# Patient Record
Sex: MALE | Race: WHITE | NOT HISPANIC OR LATINO | Employment: OTHER
[De-identification: names, ages, dates, MRNs, and addresses within clinical notes are randomized per-mention and may not be internally consistent; named-entity substitution may affect disease eponyms.]

---

## 2019-09-29 ENCOUNTER — HEALTH MAINTENANCE LETTER (OUTPATIENT)
Age: 69
End: 2019-09-29

## 2020-03-15 ENCOUNTER — HEALTH MAINTENANCE LETTER (OUTPATIENT)
Age: 70
End: 2020-03-15

## 2021-01-14 ENCOUNTER — HEALTH MAINTENANCE LETTER (OUTPATIENT)
Age: 71
End: 2021-01-14

## 2021-05-08 ENCOUNTER — HEALTH MAINTENANCE LETTER (OUTPATIENT)
Age: 71
End: 2021-05-08

## 2021-08-05 ENCOUNTER — TRANSFERRED RECORDS (OUTPATIENT)
Dept: HEALTH INFORMATION MANAGEMENT | Facility: CLINIC | Age: 71
End: 2021-08-05
Payer: MEDICARE

## 2021-10-23 ENCOUNTER — HEALTH MAINTENANCE LETTER (OUTPATIENT)
Age: 71
End: 2021-10-23

## 2022-04-28 ENCOUNTER — TRANSCRIBE ORDERS (OUTPATIENT)
Dept: OTHER | Age: 72
End: 2022-04-28
Payer: MEDICARE

## 2022-04-28 ENCOUNTER — DOCUMENTATION ONLY (OUTPATIENT)
Dept: ONCOLOGY | Facility: CLINIC | Age: 72
End: 2022-04-28
Payer: MEDICARE

## 2022-04-28 ENCOUNTER — PATIENT OUTREACH (OUTPATIENT)
Dept: ONCOLOGY | Facility: CLINIC | Age: 72
End: 2022-04-28
Payer: MEDICARE

## 2022-04-28 DIAGNOSIS — C61 PROSTATE CANCER (H): Primary | ICD-10-CM

## 2022-04-28 NOTE — PROGRESS NOTES
New Patient Oncology Nurse Navigator Note     Referring provider: Dr. Pradip Goodson     Referring Clinic/Organization: [facility]     Referred to (specialty): Medical Oncology    Requested provider (if applicable): Dr. Walker     Date Referral Received: 4/28/22     Evaluation for : Prostate cancer- second opinion- possible clinical trials and Pluvicto/PSMA therapy discussion     Clinical History (per Nurse review of records provided):    ASSESSMENT:   Copied from [facility] med onc progress note dated 4/27/22    1. Progressing metastatic high-grade prostate carcinoma with mediastinal, retrocrural and retroperitoneal adenopathy. The burden of disease is on the small side at present. PSA mostly stable.  2. History of radical retropubic prostatectomy and pelvic lymph node dissection for Maribel 9 prostate carcinoma with bilateral seminal vesicle involvement. This took place in March 2004. He received postoperative radiation therapy completed in August 2004.  3. Lymph node posterior to the esophagus identified in the summer of 2021. Biopsy on August 5, 2021, confirmed this to represent prostate carcinoma. This was the only area of metastatic disease identified at that time. He completed radiation therapy to that area on 11/26/2021.  4. Ongoing hormone therapy with Lupron and Xtandi.  5. Previous hormone therapy with Casodex which was discontinued in November 2013 when progression of the prostate cancer management was noted.  6. Previous treatment with Zytiga which was initiated in January 2014, and discontinued in January 2020.  7. Status post initiation of Xtandi in January 2020. There was initial difficulty with tolerance and the dose was reduced. Later, we were able to increase the dose to target.  8. History of sleep apnea.  9. History of sleep disorder.   10. History of restless legs syndrome.  11. History of low back pain and surgery for low back pain.  12. Status post placement of spinal  stimulator for the back pain management.  13. History of headache, improved.  14. History of altered taste and decreased appetite.            Records Location (Care Everywhere, Media, etc.):   [facility]     Records Needed:   [facility] per protocol    I attempted to call Ray to discuss referral to medical oncology.  I left a message for him to call me back.  I will also forward referral on to NPS to schedule an appointment.     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  I discussed with him the above appointment and he would like to move forward with seeing Dr. Walker on 5/18.  He is very interested in discussing the Pluvicto therapy.  He has no other questions at this time.  I let him know our NPS will call him to finalize this appt and potentially our MR team may reach out to obtain a NINO if needed to obtain imaging and pathology.  He has the location and main clinic number, as well as mine if needed for future questions.

## 2022-05-11 NOTE — TELEPHONE ENCOUNTER
RECORDS STATUS - ALL OTHER DIAGNOSIS      RECORDS RECEIVED FROM: [place] (imaging previously resolved)   DATE RECEIVED:    NOTES STATUS DETAILS   OFFICE NOTE from referring provider  -  Dr. Pradip Goodson   OFFICE NOTE from medical oncologist  -  Dr. Goodson: 22   DISCHARGE SUMMARY from hospital     DISCHARGE REPORT from the ER     OPERATIVE REPORT  -  21: EGD, EUS  3/18/04: radical retropubic prostatectomy      MEDICATION LIST [facility]   LABS     PATHOLOGY REPORTS [facility], Report in CE, slides requested   [employer] 21: [id number]  3/18/04: [id number]  04: [id number]   ANYTHING RELATED TO DIAGNOSIS Epic/CE 22   GENONOMIC TESTING     TYPE: Received . -  22: Oncotype   IMAGING (NEED IMAGES & REPORT)     CT SCANS     MRI     MAMMO     ULTRASOUND     PET

## 2022-05-17 NOTE — PROGRESS NOTES
NEW PATIENT SECOND OPINION CONSULTATION    Reason for Visit:  HOXB13 (G84E)-associated metastatic CRPC s/p abiraterone, now on enzalutamide.    History of Present Illness:  Dear Dr Pradip Goodson,    Thank you for referring your patient for a Second Opinion consultation at the [facility].  A brief overview of his oncological history as well as my recommendations follow below.     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 He underwent radical prostatectomy on 03/18/2004. Surgical pathology showed Catherine score 4+5=9 tumor involving approximately 40% of the prostate gland (right greater than left) with bilateral seminal vesicle invasion, positive margin at the right mid prostate and very close (less than 1 mm) margins in the prostatic apex and prostatic base, periprostatic adipose tissue extension at the prostate base, focal perineural invasion, small vessel lymphatic invasion, and 0/8 pelvic lymph nodes involved by metastatic disease [pT3b N0 R1]. PSA dropped post-operatively to 4.9 ng/mL on 04/09/04, but increased to 6.4 ng/mL on 04/21/04. He then started Lupron with an initial period of Casodex on 04/23/04, with PSA dropping to 0.3 ng/mL on 05/26/04. This was followed by salvage RT, from 06/28/04 to 08/19/04, to a total dose of 6840 cGy in 38 fractions.     PSA following salvage RT subsequently became undetectable at <0.1 ng/mL, but slowly began to rise until it increased to 1.4 ng/mL in 03/2010. Casodex was added to Lupron on 03/10/10 for dual androgen blockade. His PSA initially improved although never became undetectable, but later began to increase again. When PSA reached 1.8 ng/mL in 11/2013, Casodex was stopped, and when it increased further to 2.3 ng/mL in 1/2014, abiraterone 1000 mg was added on 01/31/14. His PSA remained under good control  on abiraterone at 0.2 ng/mL until 01/2020 when it slowly increased to 0.7 ng/mL. Abiraterone was then discontinued, and enzalutamide was started at 160 mg daily on 01/22/2020 which was eventually dose-reduced to 80 mg daily due to side effects and issues with tolerability, but was increased back to 160 mg daily dose in a graduated manner after another biochemical relapse.     CT imaging dated 07/16/2021 showed a slight increase in size of one mediastinal lymph node posterior to the esophagus compared to the CT from 04/2021. No prior history of distant metastatic disease. All prior CT scans had been unremarkable. Bone scan done 12/2006 had shown a new focus of radiotracer uptake in the left femoral neck which was favored to represent resolving stress reaction-related bone marrow edema on serial MRI exams and subsequent bone scans were unremarkable. Patient underwent lymph node biopsy on 08/05/2021, which demonstrated one malignant-appearing periesophageal lymph node in the 4R right lower paratracheal region measuring 20 x 15 mm, and FNA was positive for prostatic adenocarcinoma. He underwent an Axumin-PET/CT scan on 08/25/21, which was unremarkable. He was next treated with RT to a small field covering the gross tumor, using 5750 cGy in 23 fractions, from 10/25/21 through 11/24/2021.     More recently, he underwent a PSMA-PET/CT scan on 04/19/2022 which demonstrated radiotracer-avid posterior descending thoracic aortic, upper paraesophageal, retroperitoneal and retrocrural lymphadenopathy suspicious for metastases. He last met with Dr. Goodson on 04/27/2022, and was referred to us for a Second Opinion consultation regarding further management, including the potential appropriateness of 177Lu-PSMA-617. He has continued to be on enzalutamide which was dose decreased again to 80 mg daily on 07/9/2021 due to issues with weight loss and altered taste.    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   He does have some fatigue from the enzalutamide, which has improved with the lower dose.  He does not report any new signs or symptoms in recent weeks.  He has not gained or lost any weight recently.  He does not have any bone pain.  A comprehensive 14-system review of symptoms is otherwise generally unremarkable.  His ECOG score is 0.  His pain score is 3/10.    Past Medical History:  Hypertension  Atrial fibrillation  Restless legs syndrome  Multilevel disc degeneration of the spine   Chronic back pain s/p spinal stimulator  Obstructive sleep apnea  Hypersomnia  Lichen Planus    Past Surgical History:  Tonsillectomy  Lumbar laminectomy   Hemorrhoidectomy    Medications:  Lupron 22.5 mg q3mo (last, 22)  Enzalutamide 80 mg  Metoprolol succinate   Rivaroxaban  Pregabalin  Modafinil  Armodafinil  Hydroxyzine  Methadone 5 mg Q8H  Percocet 10/325 mg PRN   Ginseng extract    Social History:  Patient lives in [facility]) with his wife, Evangelina. They have two adult children. He is a retired clinical psychologist. He is a never-smoker. No history of alcohol use disorder or abuse.     Family History:  Patient's father had prostate cancer at 71, and  at 85. His family history is additionally pertinent for gastric cancer, colon cancer, breast cancer, lung cancer, lymphoma as well as colon polyps.    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 His vital signs are unremarkable.  His HEENT examination is within normal limits.  There is no palpable cervical, axillary, or inguinal lymphadenopathy.  The cardiac, pulmonary, and gastrointestinal examinations are generally within normal limits.  The extremities do not demonstrate pitting edema.  The neuromuscular examination is grossly intact.  The skin evaluation is unremarkable.     Recent PSA values:  Last PSA 2.0 ng/mL on 2022 compared to 1.6 ng/mL on 22, 3.1 ng/mL on 10/11/21, 3.1 ng/mL on 21, 2.8 ng/mL on 21, and 1.8 ng/mL on  01/21/21. Last serum testosterone level was 17 ng/dL in 2007.     Oncotype MAP Pan-Cancer Tissue test:  This was sent on 04/28/2022 from the periesophageal tita biopsy. It showed AR overexpression (3+ IHC), but was otherwise unremarkable (BRCA1/2 wildtype, TMB low 5 muts/mb, MS stable, and PD-L1 protein IHC 0%).  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    [employer] Prostate Cancer Panel:   This was sent in 05/2021. It showed an increased risk allele in the HOXB13 gene, specifically c.251G>A (p.G84E). This is a germline  variant in individuals of [place] descent.  His paternal relatives have a history of the same variant. No pathogenic variants were identified in the remaining 83 of 84 genes in the panel.     PSMA-PET scan 4/19/22:  FINDINGS: Radiotracer-positive bilateral retroperitoneal, retrocrural, posterior descending thoracic aortic, and upper paraesophageal lymph nodes suspicious for metastases. The remaining radiotracer distribution is physiologic from the skull vertex to mid thigh. Mild senescent intracranial changes. Right renal cyst. Spinal stimulation device in the left posterior flank soft tissues. Status post prostatectomy. Multilevel degenerative changes in the cervical spine. IMPRESSION: Findings suspicious for prostate cancer metastases involving lymph nodes above and below the diaphragm, but no osseous involvement.      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  He now has mCRPC with biopsy-proven disease in one paraesophageal node (08/2021) as well as PSMA-PET evidence of additional lymph tita involvement both above and below the  diaphragm (04/2022).  His ECOG score is 0.  His pain score is 0/10.    We spoke about several treatment options moving forward.  Docetaxel would be category-1 recommendation for treatment of mCRPC based on [id number] and [id number] trials. There seem to be no contraindication to docetaxel based on history and review of routine labs from 04/2022. Given patient is relatively asymptomatic with an overall low radiographic burden of metastatic disease confined to lymph nodes, we can choose to continue expectant monitoring (with continued enzalutamide treatment) and defer starting docetaxel until a later time. Since the survival benefit from docetaxel is applicable to patients both with and without symptoms, we can consider it with signs of symptomatic progression or visceral metastases even if the patient remains asymptomatic.      We propose that subsequent disease progression beyond docetaxel should be treated with cabazitaxel if patient remains a chemotherapy candidate. 177Lu-PSMA-617 was recently approved by the FDA for treatment of patients with recurrent mCRPC who were previously treated with a novel hormonal agents and a taxane-based chemotherapy, and would offer a viable additional therapeutic platform in that scenario. We should obtain an updated PSMA PET/CT at that time prior to radioligand therapy. At this time, he is not yet eligible for 177Lu-PSMA-617 treatment because he has not received prior taxane-based chemotherapy.     Thank you for involving us in the care of Dr. Prakash. Please do not hesitate to contact us should any further questions or concerns arise in the future.  A total of 80 minutes were spent on this patient today, of which more than 50% of this time was used for counseling and coordination of care.    Cameron Walker M.D.

## 2022-05-18 ENCOUNTER — LAB (OUTPATIENT)
Dept: LAB | Facility: CLINIC | Age: 72
End: 2022-05-18
Attending: INTERNAL MEDICINE
Payer: MEDICARE

## 2022-05-18 ENCOUNTER — PRE VISIT (OUTPATIENT)
Dept: ONCOLOGY | Facility: CLINIC | Age: 72
End: 2022-05-18
Payer: MEDICARE

## 2022-05-18 ENCOUNTER — ONCOLOGY VISIT (OUTPATIENT)
Dept: ONCOLOGY | Facility: CLINIC | Age: 72
End: 2022-05-18
Attending: INTERNAL MEDICINE
Payer: MEDICARE

## 2022-05-18 VITALS
HEART RATE: 64 BPM | HEIGHT: 71 IN | RESPIRATION RATE: 18 BRPM | SYSTOLIC BLOOD PRESSURE: 144 MMHG | WEIGHT: 176.4 LBS | DIASTOLIC BLOOD PRESSURE: 87 MMHG | TEMPERATURE: 98.8 F | BODY MASS INDEX: 24.69 KG/M2 | OXYGEN SATURATION: 96 %

## 2022-05-18 DIAGNOSIS — C61 PROSTATE CANCER (H): Primary | ICD-10-CM

## 2022-05-18 DIAGNOSIS — C61 PROSTATE CANCER (H): ICD-10-CM

## 2022-05-18 PROCEDURE — G0463 HOSPITAL OUTPT CLINIC VISIT: HCPCS

## 2022-05-18 PROCEDURE — 99205 OFFICE O/P NEW HI 60 MIN: CPT | Performed by: INTERNAL MEDICINE

## 2022-05-18 RX ORDER — MODAFINIL 200 MG/1
200 TABLET ORAL DAILY
COMMUNITY
Start: 2022-01-16

## 2022-05-18 RX ORDER — METOPROLOL SUCCINATE 50 MG/1
50 TABLET, EXTENDED RELEASE ORAL DAILY
COMMUNITY
Start: 2022-04-14

## 2022-05-18 RX ORDER — METHADONE HYDROCHLORIDE 5 MG/1
TABLET ORAL
COMMUNITY
Start: 2022-04-08

## 2022-05-18 RX ORDER — PREGABALIN 100 MG/1
CAPSULE ORAL
COMMUNITY
Start: 2022-04-04

## 2022-05-18 RX ORDER — HYDROXYZINE HYDROCHLORIDE 25 MG/1
TABLET, FILM COATED ORAL
COMMUNITY
Start: 2020-09-22

## 2022-05-18 RX ORDER — ENZALUTAMIDE 40 MG/1
CAPSULE ORAL
COMMUNITY
Start: 2022-04-25

## 2022-05-18 RX ORDER — RIVAROXABAN 20 MG/1
TABLET, FILM COATED ORAL
COMMUNITY
Start: 2022-04-14

## 2022-05-18 RX ORDER — ARMODAFINIL 150 MG/1
150 TABLET ORAL EVERY MORNING
COMMUNITY
Start: 2022-01-07

## 2022-05-18 RX ORDER — OXYCODONE AND ACETAMINOPHEN 10; 325 MG/1; MG/1
TABLET ORAL
COMMUNITY
Start: 2022-05-05

## 2022-05-18 ASSESSMENT — PAIN SCALES - GENERAL: PAINLEVEL: MODERATE PAIN (5)

## 2022-05-18 NOTE — NURSING NOTE
"Oncology Rooming Note    May 18, 2022 12:56 PM   Ray Prakash is a 71 year old male who presents for:    Chief Complaint   Patient presents with     New Patient     PROSTATE CANCER     Initial Vitals: BP (!) 144/87   Pulse 64   Temp 98.8  F (37.1  C) (Oral)   Resp 18   Ht 1.791 m (5' 10.51\")   Wt 80 kg (176 lb 6.4 oz)   SpO2 96%   BMI 24.94 kg/m   Estimated body mass index is 24.94 kg/m  as calculated from the following:    Height as of this encounter: 1.791 m (5' 10.51\").    Weight as of this encounter: 80 kg (176 lb 6.4 oz). Body surface area is 1.99 meters squared.  Moderate Pain (5) Comment: Data Unavailable   No LMP for male patient.  Allergies reviewed: Yes  Medications reviewed: Yes    Medications: Medication refills not needed today.  Pharmacy name entered into It[employer]beforeIbuy:    [id number] - [place], [place] - [address] [id number] - [place], [place] - [address]    Clinical concerns: New pt     Kalpana Vela CMA            "

## 2022-05-20 LAB
PATH REPORT.COMMENTS IMP SPEC: NORMAL
PATH REPORT.COMMENTS IMP SPEC: NORMAL
PATH REPORT.FINAL DX SPEC: NORMAL
PATH REPORT.RELEVANT HX SPEC: NORMAL
PATH REPORT.RELEVANT HX SPEC: NORMAL
PATH REPORT.SITE OF ORIGIN SPEC: NORMAL

## 2022-05-20 PROCEDURE — 88321 CONSLTJ&REPRT SLD PREP ELSWR: CPT | Performed by: PATHOLOGY

## 2022-05-24 LAB
PATH REPORT.COMMENTS IMP SPEC: NORMAL
PATH REPORT.FINAL DX SPEC: NORMAL
PATH REPORT.GROSS SPEC: NORMAL
PATH REPORT.MICROSCOPIC SPEC OTHER STN: NORMAL
PATH REPORT.RELEVANT HX SPEC: NORMAL
PATH REPORT.RELEVANT HX SPEC: NORMAL
PATH REPORT.SITE OF ORIGIN SPEC: NORMAL

## 2022-05-24 PROCEDURE — 88321 CONSLTJ&REPRT SLD PREP ELSWR: CPT | Mod: GC | Performed by: PATHOLOGY

## 2022-06-04 ENCOUNTER — HEALTH MAINTENANCE LETTER (OUTPATIENT)
Age: 72
End: 2022-06-04

## 2022-10-10 ENCOUNTER — HEALTH MAINTENANCE LETTER (OUTPATIENT)
Age: 72
End: 2022-10-10

## 2023-06-11 ENCOUNTER — HEALTH MAINTENANCE LETTER (OUTPATIENT)
Age: 73
End: 2023-06-11

## 2024-05-31 NOTE — PROGRESS NOTES
Action April 28, 2022 3:50 PM ABT   Action Taken Records updated in CE, image request sent to [facility]     Action May 2, 2022 3:51 PM ABT   Action Taken Images from  received and resolved to PACS        LM for pt to call back.    Pt presents ambulatory tot riage from home. Pt states notcied petechial rash this am, had labs drawn and PLT were 1. Pt has hx idiopathic thrombocytopaenia.

## 2024-08-04 ENCOUNTER — HEALTH MAINTENANCE LETTER (OUTPATIENT)
Age: 74
End: 2024-08-04

## 2024-09-03 NOTE — PROGRESS NOTES
SECOND OPINION CONSULTATION    Reason for Visit:  Metastatic node-predominant CRPC s/p abiraterone, enzalutamide, docetaxel, and darolutamide. Referral for consideration of 177Lu-PSMA-617.    History of Present Illness:  Dear Dr Pradip Goodson,    Thank you for referring your patient for a Second Opinion Consultation at the [facility].  A brief overview of his oncological history as well as my recommendations follow below.     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 He underwent radical prostatectomy on 03/18/2004. Surgical pathology showed [place] score 4+5=9 tumor involving approximately 40% of the prostate gland (right greater than left) with bilateral seminal vesicle invasion, positive margin at the right mid prostate and very close (less than 1 mm) margins in the prostatic apex and prostatic base, periprostatic adipose tissue extension at the prostate base, focal perineural invasion, small vessel lymphatic invasion, and 0/8 pelvic lymph nodes involved by metastatic disease [pT3b N0 R1]. PSA dropped post-operatively to 4.9 ng/mL on 04/09/04, but increased to 6.4 ng/mL on 04/21/04. He then started Lupron with an initial period of Casodex on 04/23/04, with PSA dropping to 0.3 ng/mL on 05/26/04. This was followed by salvage XRT, from 06/28/04 to 08/19/04, to a total dose of 6840 cGy in 38 fractions.     PSA following salvage XRT subsequently became undetectable at <0.1 ng/mL, but slowly began to rise until it increased to 1.4 ng/mL in 03/2010. Casodex was added to Lupron on 03/10/10 for dual androgen blockade. His PSA initially improved although never became undetectable, but later began to increase again. When PSA reached 1.8 ng/mL in 11/2013, Casodex was stopped, and when it increased further to 2.3 ng/mL in 1/2014, abiraterone 1000 mg was  added on 01/31/14. His PSA remained under good control on abiraterone at 0.2 ng/mL until 01/2020 when it slowly increased to 0.7 ng/mL. Abiraterone was then discontinued, and enzalutamide was started at 160 mg daily on 01/22/2020 which was eventually dose-reduced to 80 mg daily due to side effects and issues with tolerability, but was increased back to 160 mg daily dose in a graduated manner after another biochemical relapse.     CT imaging dated 07/16/2021 showed a slight increase in size of one mediastinal lymph node posterior to the esophagus compared to the CT from 04/2021. No prior history of distant metastatic disease. All prior CT scans had been unremarkable. Bone scan done 12/2006 had shown a new focus of radiotracer uptake in the left femoral neck which was favored to represent resolving stress reaction-related bone marrow edema on serial MRI exams and subsequent bone scans were unremarkable. Patient underwent lymph node biopsy on 08/05/2021, which demonstrated one malignant-appearing periesophageal lymph node in the 4R right lower paratracheal region measuring 20 x 15 mm, and FNA was positive for prostatic adenocarcinoma. He underwent an Axumin-PET/CT scan on 08/25/21, which was unremarkable. He was next treated with XRT to a small field covering the gross tumor, using 5750 cGy in 23 fractions, from 10/25/21 through 11/24/2021.     He underwent a PSMA-PET/CT scan on 04/19/2022 which demonstrated radiotracer-avid posterior descending thoracic aortic, upper paraesophageal, retroperitoneal and retrocrural lymphadenopathy suspicious for metastases. However, he had no osseous or visceral metastases at that time, and thus he continued with the lower dose of enzalutamide for one more year.  At that time (in mid 2022), he was considered for treatment with 177Lu-PSMA-617 but he was not deemed eligible because he had never received a taxane chemotherapy agent.  The patient then received docetaxel (for 6 cycles),  from 9/13/2023 until 1/3/2024.  The enzalutamide was eventually switched to darolutamide in 2/2024.  The patient then received combination therapy with ADT plus darolutamide until 8/9/2024, at which point the darolutamide was discontinued.    Over the past several months, the patient's PSA level has been steadily rising despite ongoing systemic therapy using ADT plus darolutamide.  For example, on 2/5/2024 the PSA was 3.8 ng/mL, on 5/17/2024 the PSA was 6.0 ng/mL, and on 8/9/2024 the PSA was 14.3 ng/mL.  He recently underwent another PSMA-PET scan on 8/6/2024. This showed overall increasing radiotracer uptake (compared to 2/5/2024) within previously seen lymphadenopathy in the neck, chest, and retroperitoneum concerning for interval disease worsening.  The patient is being referred at this time for consideration of 177Lu-PSMA-617 radioligand therapy.    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  He does have some fatigue and nausea from the darolutamide, which has improved with discontinuation.  He does not report any new signs or symptoms in recent weeks.  He has not gained or lost any weight recently.  He does not have any bone pain.  A comprehensive 14-system review of symptoms is otherwise generally unremarkable.  His ECOG score is 0.  His pain score is 2/10.    Past Medical/Surgical History:  Metastatic prostate cancer  Hypertension  Atrial fibrillation  Restless legs syndrome  Multilevel disc degeneration of the spine   Chronic back pain s/p spinal stimulator  Obstructive sleep apnea  Hypersomnia  Lichen Planus  Tonsillectomy  Lumbar laminectomy   Hemorrhoidectomy    Medications:  Lupron 22.5 mg every 3 months  Darolutamide - stopped  Metoprolol succinate   Rivaroxaban  Pregabalin  Modafinil  Armodafinil  Hydroxyzine  Methadone  Percocet  Ginseng extract    Social History:  Patient lives in [facility] with his wife, Evangelina. They have two adult children. He is a retired clinical  psychologist. He is a never-smoker. No history of alcohol use disorder or abuse.     Family History:  Patient's father had prostate cancer at 71, and  at 85. His family history is additionally pertinent for gastric cancer, colon cancer, breast cancer, lung cancer, and lymphoma.  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 His vital signs are unremarkable.  His HEENT examination is within normal limits.  There is no palpable cervical, axillary, or inguinal lymphadenopathy.  The cardiac, pulmonary, and gastrointestinal examinations are generally within normal limits.  The neuromuscular examination is grossly intact.  The extremities do not demonstrate pitting edema.  The skin evaluation is unremarkable.     Oncotype MAP Pan-Cancer Tissue test:  This was sent on 2022 from the periesophageal tita biopsy. It showed AR overexpression (3+ IHC), but was otherwise unremarkable (BRCA1/2 wild-type, TMB low 5 muts/mb, MS stable, and PD-L1 protein IHC 0%).  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    [facility] Prostate Cancer Panel:   This was sent in 2021. It showed an increased risk allele in the HOXB13 gene, specifically c.251G>A (p.G84E). This is a germline  variant in individuals of [place] descent.  His paternal relatives have a history of the same variant.    PSMA-PET scan 2022:  FINDINGS: Radiotracer-positive bilateral retroperitoneal, retrocrural, posterior descending thoracic aortic, and upper paraesophageal lymph nodes suspicious for metastases. The remaining radiotracer distribution is physiologic from the skull vertex to mid thigh. Mild senescent intracranial changes. Right renal cyst. Spinal stimulation device in the left posterior flank soft tissues. Status post prostatectomy. Multilevel degenerative changes in the  cervical spine. Findings suspicious for prostate cancer metastases involving lymph nodes above and below the diaphragm, but no osseous involvement.    PSMA-PET scan 2/5/2024:  Again seen are numerous involves subcentimeter lymph nodes as follows with corresponding SUV: right retrocrural 27.6 versus 42.4, additional right retrocrural 23.1 versus 28.2, left retrocrural 29.1 versus 35.2, additional left retrocrural 48.8 versus 50.4, aortocaval 36.9 versus 41.4, left periaortic SUV 16.6 versus 33.2. No radiotracer-avid bone or visceral metastases.    PSMA-PET scan 8/6/2024:  Overall increasing radiotracer uptake within previously seen lymphadenopathy in the neck, chest, and retroperitoneum concerning for interval disease worsening. New focal low level activity within the right pubic symphysis is nonspecific. No definitive scintigraphic evidence for osseous metastatic disease at this stage.     Recent PSA values:  On 2/5/2024 the PSA was 3.8 ng/mL, on 5/17/2024 the PSA was 6.0 ng/mL, and on 8/9/2024 the PSA was 14.3 ng/mL.       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  He then received 6 cycles of docetaxel chemotherapy (9/2023-1/2024), followed by darolutamide (2/2024 - 8/9/2024).  He has a positive PSMA-PET scan from 8/6/2024.  He is being referred for consideration of 177Lu-PSMA-617 radioligand therapy.  His ECOG score is 0.  His pain score is 0/10.    This patient has a long history of metastatic CRPC that involves primarily lymph node stations without osseous or visceral involvement.  He has received multiple prior systemic therapies including abiraterone, enzalutamide, docetaxel chemotherapy, and darolutamide.  In addition,  he had a recent PSMA-PET scan (8/6/2024) showing radiotracer-avid multifocal tita metastasis.  In this context, the most appropriate next line of systemic therapy would be 177Lu-PSMA-617.  He has certainly exhausted all reasonable lines of hormonal treatment, and he has already received a taxane chemotherapy in the CRPC setting.  Alternative treatment options would involve a second-line taxane agent such as cabazitaxel, although 177Lu-PSMA-617 would frankly be a better option with higher efficacy and lower toxicity.    We have spent the majority of our consultation today discussing 177Lu-PSMA-617 radioligand therapy in more detail.  In particular, we have reviewed some of the common adverse events related to this agent including fatigue, nausea, dry mouth, dry eyes, potential myelotoxicity, and potential nephrotoxicity.  We have discussed treatment logistics, which involves an intravenous injection every 6 weeks for a total of six doses.  I have indicated that the 177Lu-PSMA-617 treatment itself is administered by our Nuclear Medicine colleagues rather than in the Medical Oncology clinic.  We have reviewed some of the radiation-safety precautions required following 177Lu-PSMA-617 administration.  At the end of our detailed consultation, we have decided to proceed with 177Lu-PSMA-617 as soon as possible.  I have reminded him that he should continue to receive ADT throughout this time and that he should remain on schedule with his next Lupron injections.  I anticipate that he will be able to receive his first dose of 177Lu-PSMA-617 before the end of September 2024.  He will require a CBC and comprehensive metabolic panel about 7-14 days prior to each 177Lu-PSMA-617 dose (including the first dose), and these blood tests can certainly be coordinated through his local medical oncology office.    A total of 80 minutes were spent on this patient on the date of the encounter conducting chart review, review of test results,  interpretation of tests, patient visit, documentation and discussion with other provider(s).  The patient was given the opportunity to ask multiple questions today, all of which were answered to his satisfaction.    Cameron Walker M.D.

## 2024-09-04 ENCOUNTER — ONCOLOGY VISIT (OUTPATIENT)
Dept: ONCOLOGY | Facility: CLINIC | Age: 74
End: 2024-09-04
Attending: INTERNAL MEDICINE
Payer: MEDICARE

## 2024-09-04 VITALS
TEMPERATURE: 98.2 F | DIASTOLIC BLOOD PRESSURE: 79 MMHG | SYSTOLIC BLOOD PRESSURE: 143 MMHG | OXYGEN SATURATION: 97 % | WEIGHT: 157.3 LBS | BODY MASS INDEX: 22.24 KG/M2 | HEART RATE: 84 BPM | RESPIRATION RATE: 16 BRPM

## 2024-09-04 DIAGNOSIS — C61 MALIGNANT NEOPLASM OF PROSTATE (H): Primary | ICD-10-CM

## 2024-09-04 PROCEDURE — 99205 OFFICE O/P NEW HI 60 MIN: CPT | Performed by: INTERNAL MEDICINE

## 2024-09-04 PROCEDURE — 99417 PROLNG OP E/M EACH 15 MIN: CPT | Performed by: INTERNAL MEDICINE

## 2024-09-04 RX ORDER — LORAZEPAM 0.5 MG/1
.5-1 TABLET ORAL EVERY 6 HOURS PRN
Start: 2025-04-15

## 2024-09-04 RX ORDER — ALBUTEROL SULFATE 0.83 MG/ML
2.5 SOLUTION RESPIRATORY (INHALATION)
OUTPATIENT
Start: 2024-12-10

## 2024-09-04 RX ORDER — MEPERIDINE HYDROCHLORIDE 25 MG/ML
25 INJECTION INTRAMUSCULAR; INTRAVENOUS; SUBCUTANEOUS EVERY 30 MIN PRN
OUTPATIENT
Start: 2024-10-29

## 2024-09-04 RX ORDER — LORAZEPAM 0.5 MG/1
.5-1 TABLET ORAL EVERY 6 HOURS PRN
Status: CANCELLED
Start: 2024-09-05

## 2024-09-04 RX ORDER — ALBUTEROL SULFATE 0.83 MG/ML
2.5 SOLUTION RESPIRATORY (INHALATION)
OUTPATIENT
Start: 2025-01-21

## 2024-09-04 RX ORDER — MEPERIDINE HYDROCHLORIDE 25 MG/ML
25 INJECTION INTRAMUSCULAR; INTRAVENOUS; SUBCUTANEOUS EVERY 30 MIN PRN
OUTPATIENT
Start: 2024-12-10

## 2024-09-04 RX ORDER — EPINEPHRINE 1 MG/ML
0.3 INJECTION, SOLUTION INTRAMUSCULAR; SUBCUTANEOUS EVERY 5 MIN PRN
OUTPATIENT
Start: 2024-12-10

## 2024-09-04 RX ORDER — EPINEPHRINE 1 MG/ML
0.3 INJECTION, SOLUTION INTRAMUSCULAR; SUBCUTANEOUS EVERY 5 MIN PRN
OUTPATIENT
Start: 2025-03-04

## 2024-09-04 RX ORDER — PROCHLORPERAZINE MALEATE 5 MG
5 TABLET ORAL EVERY 6 HOURS PRN
Start: 2025-01-21

## 2024-09-04 RX ORDER — ALBUTEROL SULFATE 90 UG/1
1-2 AEROSOL, METERED RESPIRATORY (INHALATION)
Start: 2024-10-29

## 2024-09-04 RX ORDER — ONDANSETRON 4 MG/1
8 TABLET, FILM COATED ORAL
OUTPATIENT
Start: 2024-12-10

## 2024-09-04 RX ORDER — LORAZEPAM 2 MG/ML
.5-1 INJECTION INTRAMUSCULAR EVERY 6 HOURS PRN
OUTPATIENT
Start: 2025-04-15

## 2024-09-04 RX ORDER — PROCHLORPERAZINE MALEATE 5 MG
5 TABLET ORAL EVERY 6 HOURS PRN
Start: 2025-03-04

## 2024-09-04 RX ORDER — ALBUTEROL SULFATE 0.83 MG/ML
2.5 SOLUTION RESPIRATORY (INHALATION)
Status: CANCELLED | OUTPATIENT
Start: 2024-09-05

## 2024-09-04 RX ORDER — ALBUTEROL SULFATE 90 UG/1
1-2 AEROSOL, METERED RESPIRATORY (INHALATION)
Start: 2025-03-04

## 2024-09-04 RX ORDER — MEPERIDINE HYDROCHLORIDE 25 MG/ML
25 INJECTION INTRAMUSCULAR; INTRAVENOUS; SUBCUTANEOUS EVERY 30 MIN PRN
OUTPATIENT
Start: 2025-04-15

## 2024-09-04 RX ORDER — MEPERIDINE HYDROCHLORIDE 25 MG/ML
25 INJECTION INTRAMUSCULAR; INTRAVENOUS; SUBCUTANEOUS EVERY 30 MIN PRN
OUTPATIENT
Start: 2025-01-21

## 2024-09-04 RX ORDER — METHYLPREDNISOLONE SODIUM SUCCINATE 125 MG/2ML
125 INJECTION, POWDER, LYOPHILIZED, FOR SOLUTION INTRAMUSCULAR; INTRAVENOUS
Start: 2025-01-21

## 2024-09-04 RX ORDER — MEPERIDINE HYDROCHLORIDE 25 MG/ML
25 INJECTION INTRAMUSCULAR; INTRAVENOUS; SUBCUTANEOUS EVERY 30 MIN PRN
Status: CANCELLED | OUTPATIENT
Start: 2024-09-05

## 2024-09-04 RX ORDER — ALBUTEROL SULFATE 90 UG/1
1-2 AEROSOL, METERED RESPIRATORY (INHALATION)
Start: 2025-01-21

## 2024-09-04 RX ORDER — METHYLPREDNISOLONE SODIUM SUCCINATE 125 MG/2ML
125 INJECTION, POWDER, LYOPHILIZED, FOR SOLUTION INTRAMUSCULAR; INTRAVENOUS
Start: 2025-04-15

## 2024-09-04 RX ORDER — DIPHENHYDRAMINE HYDROCHLORIDE 50 MG/ML
50 INJECTION INTRAMUSCULAR; INTRAVENOUS
Start: 2024-12-10

## 2024-09-04 RX ORDER — LORAZEPAM 2 MG/ML
.5-1 INJECTION INTRAMUSCULAR EVERY 6 HOURS PRN
OUTPATIENT
Start: 2025-01-21

## 2024-09-04 RX ORDER — ALBUTEROL SULFATE 0.83 MG/ML
2.5 SOLUTION RESPIRATORY (INHALATION)
OUTPATIENT
Start: 2024-10-29

## 2024-09-04 RX ORDER — EPINEPHRINE 1 MG/ML
0.3 INJECTION, SOLUTION INTRAMUSCULAR; SUBCUTANEOUS EVERY 5 MIN PRN
OUTPATIENT
Start: 2025-01-21

## 2024-09-04 RX ORDER — LORAZEPAM 0.5 MG/1
.5-1 TABLET ORAL EVERY 6 HOURS PRN
Start: 2025-01-21

## 2024-09-04 RX ORDER — DIPHENHYDRAMINE HYDROCHLORIDE 50 MG/ML
50 INJECTION INTRAMUSCULAR; INTRAVENOUS
Status: CANCELLED
Start: 2024-09-05

## 2024-09-04 RX ORDER — LORAZEPAM 2 MG/ML
.5-1 INJECTION INTRAMUSCULAR EVERY 6 HOURS PRN
OUTPATIENT
Start: 2025-03-04

## 2024-09-04 RX ORDER — LORAZEPAM 0.5 MG/1
.5-1 TABLET ORAL EVERY 6 HOURS PRN
Start: 2024-10-29

## 2024-09-04 RX ORDER — LORAZEPAM 2 MG/ML
.5-1 INJECTION INTRAMUSCULAR EVERY 6 HOURS PRN
OUTPATIENT
Start: 2024-12-10

## 2024-09-04 RX ORDER — PROCHLORPERAZINE MALEATE 5 MG
5 TABLET ORAL EVERY 6 HOURS PRN
Status: CANCELLED
Start: 2024-09-05

## 2024-09-04 RX ORDER — LORAZEPAM 0.5 MG/1
.5-1 TABLET ORAL EVERY 6 HOURS PRN
Start: 2025-03-04

## 2024-09-04 RX ORDER — ONDANSETRON 4 MG/1
8 TABLET, FILM COATED ORAL
Status: CANCELLED | OUTPATIENT
Start: 2024-09-05

## 2024-09-04 RX ORDER — DIPHENHYDRAMINE HYDROCHLORIDE 50 MG/ML
50 INJECTION INTRAMUSCULAR; INTRAVENOUS
Start: 2025-04-15

## 2024-09-04 RX ORDER — ALBUTEROL SULFATE 90 UG/1
1-2 AEROSOL, METERED RESPIRATORY (INHALATION)
Status: CANCELLED
Start: 2024-09-05

## 2024-09-04 RX ORDER — EPINEPHRINE 1 MG/ML
0.3 INJECTION, SOLUTION INTRAMUSCULAR; SUBCUTANEOUS EVERY 5 MIN PRN
OUTPATIENT
Start: 2024-10-29

## 2024-09-04 RX ORDER — METHYLPREDNISOLONE SODIUM SUCCINATE 125 MG/2ML
125 INJECTION, POWDER, LYOPHILIZED, FOR SOLUTION INTRAMUSCULAR; INTRAVENOUS
Start: 2024-10-29

## 2024-09-04 RX ORDER — EPINEPHRINE 1 MG/ML
0.3 INJECTION, SOLUTION INTRAMUSCULAR; SUBCUTANEOUS EVERY 5 MIN PRN
OUTPATIENT
Start: 2025-04-15

## 2024-09-04 RX ORDER — DIPHENHYDRAMINE HYDROCHLORIDE 50 MG/ML
50 INJECTION INTRAMUSCULAR; INTRAVENOUS
Start: 2024-10-29

## 2024-09-04 RX ORDER — METHYLPREDNISOLONE SODIUM SUCCINATE 125 MG/2ML
125 INJECTION, POWDER, LYOPHILIZED, FOR SOLUTION INTRAMUSCULAR; INTRAVENOUS
Status: CANCELLED
Start: 2024-09-05

## 2024-09-04 RX ORDER — MEPERIDINE HYDROCHLORIDE 25 MG/ML
25 INJECTION INTRAMUSCULAR; INTRAVENOUS; SUBCUTANEOUS EVERY 30 MIN PRN
OUTPATIENT
Start: 2025-03-04

## 2024-09-04 RX ORDER — LORAZEPAM 2 MG/ML
.5-1 INJECTION INTRAMUSCULAR EVERY 6 HOURS PRN
Status: CANCELLED | OUTPATIENT
Start: 2024-09-05

## 2024-09-04 RX ORDER — LORAZEPAM 2 MG/ML
.5-1 INJECTION INTRAMUSCULAR EVERY 6 HOURS PRN
OUTPATIENT
Start: 2024-10-29

## 2024-09-04 RX ORDER — LORAZEPAM 0.5 MG/1
.5-1 TABLET ORAL EVERY 6 HOURS PRN
Start: 2024-12-10

## 2024-09-04 RX ORDER — ONDANSETRON 4 MG/1
8 TABLET, FILM COATED ORAL
OUTPATIENT
Start: 2025-04-15

## 2024-09-04 RX ORDER — PROCHLORPERAZINE MALEATE 5 MG
5 TABLET ORAL EVERY 6 HOURS PRN
Start: 2025-04-15

## 2024-09-04 RX ORDER — DIPHENHYDRAMINE HYDROCHLORIDE 50 MG/ML
50 INJECTION INTRAMUSCULAR; INTRAVENOUS
Start: 2025-03-04

## 2024-09-04 RX ORDER — PROCHLORPERAZINE MALEATE 5 MG
5 TABLET ORAL EVERY 6 HOURS PRN
Start: 2024-12-10

## 2024-09-04 RX ORDER — ALBUTEROL SULFATE 90 UG/1
1-2 AEROSOL, METERED RESPIRATORY (INHALATION)
Start: 2024-12-10

## 2024-09-04 RX ORDER — ONDANSETRON 4 MG/1
8 TABLET, FILM COATED ORAL
OUTPATIENT
Start: 2025-01-21

## 2024-09-04 RX ORDER — METHYLPREDNISOLONE SODIUM SUCCINATE 125 MG/2ML
125 INJECTION, POWDER, LYOPHILIZED, FOR SOLUTION INTRAMUSCULAR; INTRAVENOUS
Start: 2025-03-04

## 2024-09-04 RX ORDER — ALBUTEROL SULFATE 0.83 MG/ML
2.5 SOLUTION RESPIRATORY (INHALATION)
OUTPATIENT
Start: 2025-04-15

## 2024-09-04 RX ORDER — METHYLPREDNISOLONE SODIUM SUCCINATE 125 MG/2ML
125 INJECTION, POWDER, LYOPHILIZED, FOR SOLUTION INTRAMUSCULAR; INTRAVENOUS
Start: 2024-12-10

## 2024-09-04 RX ORDER — DIPHENHYDRAMINE HYDROCHLORIDE 50 MG/ML
50 INJECTION INTRAMUSCULAR; INTRAVENOUS
Start: 2025-01-21

## 2024-09-04 RX ORDER — ONDANSETRON 4 MG/1
8 TABLET, FILM COATED ORAL
OUTPATIENT
Start: 2025-03-04

## 2024-09-04 RX ORDER — ALBUTEROL SULFATE 90 UG/1
1-2 AEROSOL, METERED RESPIRATORY (INHALATION)
Start: 2025-04-15

## 2024-09-04 RX ORDER — PROCHLORPERAZINE MALEATE 5 MG
5 TABLET ORAL EVERY 6 HOURS PRN
Start: 2024-10-29

## 2024-09-04 RX ORDER — ONDANSETRON 4 MG/1
8 TABLET, FILM COATED ORAL
OUTPATIENT
Start: 2024-10-29

## 2024-09-04 RX ORDER — ALBUTEROL SULFATE 0.83 MG/ML
2.5 SOLUTION RESPIRATORY (INHALATION)
OUTPATIENT
Start: 2025-03-04

## 2024-09-04 RX ORDER — EPINEPHRINE 1 MG/ML
0.3 INJECTION, SOLUTION INTRAMUSCULAR; SUBCUTANEOUS EVERY 5 MIN PRN
Status: CANCELLED | OUTPATIENT
Start: 2024-09-05

## 2024-09-04 ASSESSMENT — PAIN SCALES - GENERAL: PAINLEVEL: SEVERE PAIN (6)

## 2024-09-04 NOTE — LETTER
9/4/2024      Ray Prakash  [address]  [facility] [address]      Dear Colleague,    Thank you for referring your patient, Ray Prakash, to the [facility]. Please see a copy of my visit note below.      SECOND OPINION CONSULTATION    Reason for Visit:  Metastatic node-predominant CRPC s/p abiraterone, enzalutamide, docetaxel, and darolutamide. Referral for consideration of 177Lu-PSMA-617.    History of Present Illness:  Dear Dr Pradip Goodson,    Thank you for referring your patient for a Second Opinion Consultation at the [facility].  A brief overview of his oncological history as well as my recommendations follow below.     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 He underwent radical prostatectomy on 03/18/2004. Surgical pathology showed Maribel score 4+5=9 tumor involving approximately 40% of the prostate gland (right greater than left) with bilateral seminal vesicle invasion, positive margin at the right mid prostate and very close (less than 1 mm) margins in the prostatic apex and prostatic base, periprostatic adipose tissue extension at the prostate base, focal perineural invasion, small vessel lymphatic invasion, and 0/8 pelvic lymph nodes involved by metastatic disease [pT3b N0 R1]. PSA dropped post-operatively to 4.9 ng/mL on 04/09/04, but increased to 6.4 ng/mL on 04/21/04. He then started Lupron with an initial period of Casodex on 04/23/04, with PSA dropping to 0.3 ng/mL on 05/26/04. This was followed by salvage XRT, from 06/28/04 to 08/19/04, to a total dose of 6840 cGy in 38 fractions.     PSA following salvage XRT subsequently became undetectable at <0.1 ng/mL, but slowly began to rise until it increased to 1.4 ng/mL in 03/2010. Casodex was added to Lupron on 03/10/10 for dual androgen  blockade. His PSA initially improved although never became undetectable, but later began to increase again. When PSA reached 1.8 ng/mL in 11/2013, Casodex was stopped, and when it increased further to 2.3 ng/mL in 1/2014, abiraterone 1000 mg was added on 01/31/14. His PSA remained under good control on abiraterone at 0.2 ng/mL until 01/2020 when it slowly increased to 0.7 ng/mL. Abiraterone was then discontinued, and enzalutamide was started at 160 mg daily on 01/22/2020 which was eventually dose-reduced to 80 mg daily due to side effects and issues with tolerability, but was increased back to 160 mg daily dose in a graduated manner after another biochemical relapse.     CT imaging dated 07/16/2021 showed a slight increase in size of one mediastinal lymph node posterior to the esophagus compared to the CT from 04/2021. No prior history of distant metastatic disease. All prior CT scans had been unremarkable. Bone scan done 12/2006 had shown a new focus of radiotracer uptake in the left femoral neck which was favored to represent resolving stress reaction-related bone marrow edema on serial MRI exams and subsequent bone scans were unremarkable. Patient underwent lymph node biopsy on 08/05/2021, which demonstrated one malignant-appearing periesophageal lymph node in the 4R right lower paratracheal region measuring 20 x 15 mm, and FNA was positive for prostatic adenocarcinoma. He underwent an Axumin-PET/CT scan on 08/25/21, which was unremarkable. He was next treated with XRT to a small field covering the gross tumor, using 5750 cGy in 23 fractions, from 10/25/21 through 11/24/2021.     He underwent a PSMA-PET/CT scan on 04/19/2022 which demonstrated radiotracer-avid posterior descending thoracic aortic, upper paraesophageal, retroperitoneal and retrocrural lymphadenopathy suspicious for metastases. However, he had no osseous or visceral metastases at that time, and thus he continued with the lower dose of enzalutamide  for one more year.  At that time (in mid 2022), he was considered for treatment with 177Lu-PSMA-617 but he was not deemed eligible because he had never received a taxane chemotherapy agent.  The patient then received docetaxel (for 6 cycles), from 9/13/2023 until 1/3/2024.  The enzalutamide was eventually switched to darolutamide in 2/2024.  The patient then received combination therapy with ADT plus darolutamide until 8/9/2024, at which point the darolutamide was discontinued.    Over the past several months, the patient's PSA level has been steadily rising despite ongoing systemic therapy using ADT plus darolutamide.  For example, on 2/5/2024 the PSA was 3.8 ng/mL, on 5/17/2024 the PSA was 6.0 ng/mL, and on 8/9/2024 the PSA was 14.3 ng/mL.  He recently underwent another PSMA-PET scan on 8/6/2024. This showed overall increasing radiotracer uptake (compared to 2/5/2024) within previously seen lymphadenopathy in the neck, chest, and retroperitoneum concerning for interval disease worsening.  The patient is being referred at this time for consideration of 177Lu-PSMA-617 radioligand therapy.    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  He does have some fatigue and nausea from the darolutamide, which has improved with discontinuation.  He does not report any new signs or symptoms in recent weeks.  He has not gained or lost any weight recently.  He does not have any bone pain.  A comprehensive 14-system review of symptoms is otherwise generally unremarkable.  His ECOG score is 0.  His pain score is 2/10.    Past Medical/Surgical History:  Metastatic prostate cancer  Hypertension  Atrial fibrillation  Restless legs syndrome  Multilevel disc degeneration of the spine   Chronic back pain s/p spinal stimulator  Obstructive sleep apnea  Hypersomnia  Lichen Planus  Tonsillectomy  Lumbar laminectomy   Hemorrhoidectomy    Medications:  Lupron 22.5 mg every 3 months  Darolutamide - stopped  Metoprolol  succinate   Rivaroxaban  Pregabalin  Modafinil  Armodafinil  Hydroxyzine  Methadone  Percocet  Ginseng extract    Social History:  Patient lives in [facility] with his wife, Evangelina. They have two adult children. He is a retired clinical psychologist. He is a never-smoker. No history of alcohol use disorder or abuse.     Family History:  Patient's father had prostate cancer at 71, and  at 85. His family history is additionally pertinent for gastric cancer, colon cancer, breast cancer, lung cancer, and lymphoma.  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 His vital signs are unremarkable.  His HEENT examination is within normal limits.  There is no palpable cervical, axillary, or inguinal lymphadenopathy.  The cardiac, pulmonary, and gastrointestinal examinations are generally within normal limits.  The neuromuscular examination is grossly intact.  The extremities do not demonstrate pitting edema.  The skin evaluation is unremarkable.     Oncotype MAP Pan-Cancer Tissue test:  This was sent on 2022 from the periesophageal tita biopsy. It showed AR overexpression (3+ IHC), but was otherwise unremarkable (BRCA1/2 wild-type, TMB low 5 muts/mb, MS stable, and PD-L1 protein IHC 0%).  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    [facility] Prostate Cancer Panel:   This was sent in 2021. It showed an increased risk allele in the HOXB13 gene, specifically c.251G>A (p.G84E). This is a germline  variant in individuals of [place] descent.  His paternal relatives have a history of the same variant.    PSMA-PET scan 2022:  FINDINGS: Radiotracer-positive bilateral retroperitoneal, retrocrural, posterior descending thoracic aortic, and upper paraesophageal lymph nodes suspicious for metastases. The remaining radiotracer distribution is  physiologic from the skull vertex to mid thigh. Mild senescent intracranial changes. Right renal cyst. Spinal stimulation device in the left posterior flank soft tissues. Status post prostatectomy. Multilevel degenerative changes in the cervical spine. Findings suspicious for prostate cancer metastases involving lymph nodes above and below the diaphragm, but no osseous involvement.    PSMA-PET scan 2/5/2024:  Again seen are numerous involves subcentimeter lymph nodes as follows with corresponding SUV: right retrocrural 27.6 versus 42.4, additional right retrocrural 23.1 versus 28.2, left retrocrural 29.1 versus 35.2, additional left retrocrural 48.8 versus 50.4, aortocaval 36.9 versus 41.4, left periaortic SUV 16.6 versus 33.2. No radiotracer-avid bone or visceral metastases.    PSMA-PET scan 8/6/2024:  Overall increasing radiotracer uptake within previously seen lymphadenopathy in the neck, chest, and retroperitoneum concerning for interval disease worsening. New focal low level activity within the right pubic symphysis is nonspecific. No definitive scintigraphic evidence for osseous metastatic disease at this stage.     Recent PSA values:  On 2/5/2024 the PSA was 3.8 ng/mL, on 5/17/2024 the PSA was 6.0 ng/mL, and on 8/9/2024 the PSA was 14.3 ng/mL.       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  He then received 6 cycles of docetaxel chemotherapy (9/2023-1/2024), followed by darolutamide (2/2024 - 8/9/2024).  He has a positive PSMA-PET scan from 8/6/2024.  He is being referred for consideration of 177Lu-PSMA-617 radioligand therapy.  His ECOG score is 0.  His pain score is 0/10.    This patient has a long history of  metastatic CRPC that involves primarily lymph node stations without osseous or visceral involvement.  He has received multiple prior systemic therapies including abiraterone, enzalutamide, docetaxel chemotherapy, and darolutamide.  In addition, he had a recent PSMA-PET scan (8/6/2024) showing radiotracer-avid multifocal tita metastasis.  In this context, the most appropriate next line of systemic therapy would be 177Lu-PSMA-617.  He has certainly exhausted all reasonable lines of hormonal treatment, and he has already received a taxane chemotherapy in the CRPC setting.  Alternative treatment options would involve a second-line taxane agent such as cabazitaxel, although 177Lu-PSMA-617 would frankly be a better option with higher efficacy and lower toxicity.    We have spent the majority of our consultation today discussing 177Lu-PSMA-617 radioligand therapy in more detail.  In particular, we have reviewed some of the common adverse events related to this agent including fatigue, nausea, dry mouth, dry eyes, potential myelotoxicity, and potential nephrotoxicity.  We have discussed treatment logistics, which involves an intravenous injection every 6 weeks for a total of six doses.  I have indicated that the 177Lu-PSMA-617 treatment itself is administered by our Nuclear Medicine colleagues rather than in the Medical Oncology clinic.  We have reviewed some of the radiation-safety precautions required following 177Lu-PSMA-617 administration.  At the end of our detailed consultation, we have decided to proceed with 177Lu-PSMA-617 as soon as possible.  I have reminded him that he should continue to receive ADT throughout this time and that he should remain on schedule with his next Lupron injections.  I anticipate that he will be able to receive his first dose of 177Lu-PSMA-617 before the end of September 2024.  He will require a CBC and comprehensive metabolic panel about 7-14 days prior to each 177Lu-PSMA-617 dose  (including the first dose), and these blood tests can certainly be coordinated through his local medical oncology office.    A total of 80 minutes were spent on this patient on the date of the encounter conducting chart review, review of test results, interpretation of tests, patient visit, documentation and discussion with other provider(s).  The patient was given the opportunity to ask multiple questions today, all of which were answered to his satisfaction.    Cameron Walker M.D.      Again, thank you for allowing me to participate in the care of your patient.        Sincerely,        Cameron Walker MD

## 2024-09-04 NOTE — NURSING NOTE
"Oncology Rooming Note    September 4, 2024 7:53 AM   Ray Prakash is a 73 year old male who presents for:    No chief complaint on file.    Initial Vitals: BP (!) 143/79 (BP Location: Left arm, Patient Position: Sitting, Cuff Size: Adult Regular)   Pulse 84   Temp 98.2  F (36.8  C) (Oral)   Resp 16   Wt 71.4 kg (157 lb 4.8 oz)   SpO2 97%   BMI 22.24 kg/m   Estimated body mass index is 22.24 kg/m  as calculated from the following:    Height as of 5/18/22: 1.791 m (5' 10.51\").    Weight as of this encounter: 71.4 kg (157 lb 4.8 oz). Body surface area is 1.88 meters squared.  Severe Pain (6) Comment: Data Unavailable   No LMP for male patient.  Allergies reviewed: Yes  Medications reviewed: No - time constraint     Medications: Medication refills not needed today.  Pharmacy name entered into [employer]:    [id number] - [place], [place] - [address] [id number] - [place], [place] - [address] AT [facility] [address]    Frailty Screening:   Is the patient here for a new oncology consult visit in cancer care? 2. No      Clinical concerns: none       Kathya Avery            "

## 2024-09-05 PROBLEM — C77.9 METASTASIS TO LYMPH NODES (H): Status: ACTIVE | Noted: 2024-09-05

## 2024-09-05 PROBLEM — C77.8 MALIGNANT NEOPLASM OF PROSTATE METASTATIC TO LYMPH NODES OF MULTIPLE SITES (H): Status: ACTIVE | Noted: 2024-09-05

## 2024-09-05 PROBLEM — C61 MALIGNANT NEOPLASM OF PROSTATE METASTATIC TO LYMPH NODES OF MULTIPLE SITES (H): Status: ACTIVE | Noted: 2024-09-05

## 2024-09-09 ENCOUNTER — PATIENT OUTREACH (OUTPATIENT)
Dept: ONCOLOGY | Facility: CLINIC | Age: 74
End: 2024-09-09
Payer: MEDICARE

## 2024-09-09 DIAGNOSIS — C61 MALIGNANT NEOPLASM OF PROSTATE (H): Primary | ICD-10-CM

## 2024-09-09 NOTE — PROGRESS NOTES
[facility]: Cancer Care                                                                                      Lab orders to be done 2 weeks prior to Pluvicto and PRN faxed to [facility] per patient request. Confirmation of successful fax transmission received.    RNCC spoke with Ray and advised him that the lab orders have been faxed.  He knows to get labs no later than 2 weeks prior to each Pluvicto treatment.  As for provider visits, he said Dr. Walker and his oncologist at [facility] agreed he'd be monitored by the [facility] team.  This writer will watch for lab results and bring anything concerning to Dr. Walker's attention.    9/10/24  A copy of the orders for CBC, CMP, and PSA faxed to [facility] at [phone].    Linnette aSl, RN, BSN, OCN  Oncology RN Care Coordinator  [facility]   52

## 2024-09-23 ENCOUNTER — HOSPITAL ENCOUNTER (OUTPATIENT)
Dept: NUCLEAR MEDICINE | Facility: CLINIC | Age: 74
Setting detail: NUCLEAR MEDICINE
Discharge: HOME | End: 2024-09-23
Attending: INTERNAL MEDICINE | Admitting: INTERNAL MEDICINE
Payer: MEDICARE

## 2024-09-23 VITALS
SYSTOLIC BLOOD PRESSURE: 138 MMHG | DIASTOLIC BLOOD PRESSURE: 78 MMHG | OXYGEN SATURATION: 98 % | HEART RATE: 78 BPM | RESPIRATION RATE: 16 BRPM

## 2024-09-23 DIAGNOSIS — C77.8 MALIGNANT NEOPLASM OF PROSTATE METASTATIC TO LYMPH NODES OF MULTIPLE SITES (H): Primary | ICD-10-CM

## 2024-09-23 DIAGNOSIS — C61 MALIGNANT NEOPLASM OF PROSTATE METASTATIC TO LYMPH NODES OF MULTIPLE SITES (H): Primary | ICD-10-CM

## 2024-09-23 DIAGNOSIS — C61 MALIGNANT NEOPLASM OF PROSTATE (H): ICD-10-CM

## 2024-09-23 PROCEDURE — 344N000001 HC RX 344: Performed by: INTERNAL MEDICINE

## 2024-09-23 PROCEDURE — 79101 NUCLEAR RX IV ADMIN: CPT | Mod: 26 | Performed by: RADIOLOGY

## 2024-09-23 PROCEDURE — 999N000128 HC STATISTIC PERIPHERAL IV START W/O US GUIDANCE

## 2024-09-23 PROCEDURE — A9607 HC RX 344: HCPCS | Performed by: INTERNAL MEDICINE

## 2024-09-23 PROCEDURE — 79101 NUCLEAR RX IV ADMIN: CPT

## 2024-09-23 RX ORDER — PROCHLORPERAZINE MALEATE 5 MG
5 TABLET ORAL EVERY 6 HOURS PRN
Status: DISCONTINUED | OUTPATIENT
Start: 2024-09-23 | End: 2024-09-24 | Stop reason: HOSPADM

## 2024-09-23 RX ORDER — ALBUTEROL SULFATE 0.83 MG/ML
2.5 SOLUTION RESPIRATORY (INHALATION)
Status: DISCONTINUED | OUTPATIENT
Start: 2024-09-23 | End: 2024-09-24 | Stop reason: HOSPADM

## 2024-09-23 RX ORDER — LORAZEPAM 0.5 MG/1
.5-1 TABLET ORAL EVERY 6 HOURS PRN
Status: DISCONTINUED | OUTPATIENT
Start: 2024-09-23 | End: 2024-09-24 | Stop reason: HOSPADM

## 2024-09-23 RX ORDER — EPINEPHRINE 1 MG/ML
0.3 INJECTION, SOLUTION, CONCENTRATE INTRAVENOUS EVERY 5 MIN PRN
Status: DISCONTINUED | OUTPATIENT
Start: 2024-09-23 | End: 2024-09-24 | Stop reason: HOSPADM

## 2024-09-23 RX ORDER — DIPHENHYDRAMINE HYDROCHLORIDE 50 MG/ML
50 INJECTION INTRAMUSCULAR; INTRAVENOUS
Status: DISCONTINUED | OUTPATIENT
Start: 2024-09-23 | End: 2024-09-24 | Stop reason: HOSPADM

## 2024-09-23 RX ORDER — ONDANSETRON 8 MG/1
8 TABLET, FILM COATED ORAL
Status: DISCONTINUED | OUTPATIENT
Start: 2024-09-23 | End: 2024-09-24 | Stop reason: HOSPADM

## 2024-09-23 RX ORDER — ALBUTEROL SULFATE 90 UG/1
1-2 AEROSOL, METERED RESPIRATORY (INHALATION)
Status: DISCONTINUED | OUTPATIENT
Start: 2024-09-23 | End: 2024-09-24 | Stop reason: HOSPADM

## 2024-09-23 RX ORDER — METHYLPREDNISOLONE SODIUM SUCCINATE 125 MG/2ML
125 INJECTION, POWDER, LYOPHILIZED, FOR SOLUTION INTRAMUSCULAR; INTRAVENOUS
Status: DISCONTINUED | OUTPATIENT
Start: 2024-09-23 | End: 2024-09-24 | Stop reason: HOSPADM

## 2024-09-23 RX ORDER — LORAZEPAM 2 MG/ML
.5-1 INJECTION INTRAMUSCULAR EVERY 6 HOURS PRN
Status: DISCONTINUED | OUTPATIENT
Start: 2024-09-23 | End: 2024-09-24 | Stop reason: HOSPADM

## 2024-09-23 RX ORDER — MEPERIDINE HYDROCHLORIDE 25 MG/ML
25 INJECTION INTRAMUSCULAR; INTRAVENOUS; SUBCUTANEOUS EVERY 30 MIN PRN
Status: DISCONTINUED | OUTPATIENT
Start: 2024-09-23 | End: 2024-09-24 | Stop reason: HOSPADM

## 2024-09-23 RX ADMIN — LUTETIUM LU 177 VIPIVOTIDE TETRAXETAN 200 MILLICURIE: 27 INJECTION, SOLUTION INTRAVENOUS at 10:36

## 2024-09-23 NOTE — LETTER
September 9, 2024    Ray Prakash  [address]  [facility] [address]    Sandeep Bell,    You are scheduled to receive PLUVICTO at the Radiotheranostics Department at   [facility], [address] [place] [address].     Below are your scheduled infusion appointments.      Your [facility] Oncology team will reach out to you to schedule labs approximately two weeks prior to each of the dates below:      9/23/24 at 10am    11/4/24 at 10am    12/16/24 at 10am    1/27/25 at 10am    3/10/25 at 10am    4/21/25 at 10am      When you arrive at the hospital (circled below), proceed to the 1st floor (lower level) PET / MRI / Radiotheranostics Waiting Room     Parking is available at the front door of the [facility], or you can self-park in the Hospital Patient/Visitor Ramp (highlighted below)  If you are directed to go to the [facility] Waiting Area, please do NOT go there.               Prior to Infusion:  If you have any signs of illness such as fever, cough, or body aches - please notify us immediately.       Day of Infusion:  The treatment will take approximately 2 hours. TV and Wi-Fi are available.   You may bring and take all of your regularly scheduled home medications unless advised differently by your doctor.  Visitors - Due to the risk of radiation exposure within the therapy area, visitors will not be allowed to stay with you during the 15 minute infusion, but may return to be with you after the infusion is completed. You may have imaging after some of your infusion appointments, which will take approximately 30 minutes more. In general, please plan on setting aside about 2 hours in total for your appointment.                               AFTER your infusion (Radiation safety guidelines)     Please follow the guidance below after your treatment. This guidance will help limit how much radiation you and others are exposed to:      Remain hydrated and urinate  frequently in order to eliminate the product from your body     Limit close contact (less than 3 feet) with others in the household for 2 days or with pregnant women or children for 7 days     Sleep in a separate bedroom from others for 3 days, from children for 7 days, or from pregnant women for 15 days. Daily showering is recommended for at least the first 5 days.     Refrain from sexual contact for 7 days     Use effective contraception during the entire treatment regimen and for 14 weeks after the final dose. All radiopharmaceuticals including this therapy have the potential to cause fetal harm     Avoid using public transportation for 5 days.    Use separate bathroom for 5 days. Sit down even when urinating. Flush the toilet twice after each use.     Contact your health care provider if you experience negative health effects after your therapy, including fever, chills, sore throat, mouth ulcers, weakness/tiredness, pale skin, spontaneous bleeding/bruising, shortness of breath, and/or passing urine less frequently or in smaller amounts than usual     If you are incontinent, place disposable soiled items (e.g., Depends) in a 5-gallon pail with a tightfitting lid for 5 days.  After 5 days store the pail in a basement or garage for 70 days. Place entire pail in regular trash after 70 days.            If you have any questions, please call the Radiotheranostics nurses at [phone] or your Nurse Care Coordinator at the clinic.       We look forward to seeing you at your appointment!      The [facility] Radiotheranostics Team

## 2024-09-23 NOTE — PROGRESS NOTES
Radiotheranostics Nursing Note:    Patient presents today for Pluvicto therapy, dose 1 of  6  Patient seen by provider today: Yes: Dr. Vizcarra       Intravenous Access:  Peripheral IV placed by vascular RN     Treatment Conditions:  Labs done on  9/12/24    Results reviewed, labs Met treatment parameters, ok to proceed with treatment.           Post Infusion Assessment:  Patient tolerated infusion without incident.    PIV - No evidence of extravasations, access discontinued per protocol.         Discharge Plan:   Patient will return as scheduled for next appointment.   Patient discharged at 11:10am in stable condition accompanied by: wife  Departure Mode: Ambulatory

## 2024-10-23 ENCOUNTER — MYC MEDICAL ADVICE (OUTPATIENT)
Dept: ONCOLOGY | Facility: CLINIC | Age: 74
End: 2024-10-23
Payer: MEDICARE

## 2024-11-04 ENCOUNTER — HOSPITAL ENCOUNTER (OUTPATIENT)
Dept: NUCLEAR MEDICINE | Facility: CLINIC | Age: 74
Setting detail: NUCLEAR MEDICINE
Discharge: HOME | End: 2024-11-04
Attending: INTERNAL MEDICINE
Payer: MEDICARE

## 2024-11-04 VITALS
HEART RATE: 80 BPM | OXYGEN SATURATION: 99 % | SYSTOLIC BLOOD PRESSURE: 112 MMHG | RESPIRATION RATE: 16 BRPM | DIASTOLIC BLOOD PRESSURE: 76 MMHG

## 2024-11-04 DIAGNOSIS — C61 MALIGNANT NEOPLASM OF PROSTATE (H): Primary | ICD-10-CM

## 2024-11-04 PROCEDURE — 344N000001 HC RX 344 MED OP 636: Mod: JZ | Performed by: INTERNAL MEDICINE

## 2024-11-04 PROCEDURE — 79101 NUCLEAR RX IV ADMIN: CPT

## 2024-11-04 PROCEDURE — 79101 NUCLEAR RX IV ADMIN: CPT | Mod: 26 | Performed by: RADIOLOGY

## 2024-11-04 PROCEDURE — A9607 HC RX 344 MED OP 636: HCPCS | Mod: JZ | Performed by: INTERNAL MEDICINE

## 2024-11-04 RX ORDER — PROCHLORPERAZINE MALEATE 5 MG/1
5 TABLET ORAL EVERY 6 HOURS PRN
Status: DISCONTINUED | OUTPATIENT
Start: 2024-11-04 | End: 2024-11-05 | Stop reason: HOSPADM

## 2024-11-04 RX ORDER — ALBUTEROL SULFATE 90 UG/1
1-2 INHALANT RESPIRATORY (INHALATION)
Status: DISCONTINUED | OUTPATIENT
Start: 2024-11-04 | End: 2024-11-05 | Stop reason: HOSPADM

## 2024-11-04 RX ORDER — MEPERIDINE HYDROCHLORIDE 25 MG/ML
25 INJECTION INTRAMUSCULAR; INTRAVENOUS; SUBCUTANEOUS EVERY 30 MIN PRN
Status: DISCONTINUED | OUTPATIENT
Start: 2024-11-04 | End: 2024-11-05 | Stop reason: HOSPADM

## 2024-11-04 RX ORDER — EPINEPHRINE 1 MG/ML
0.3 INJECTION, SOLUTION, CONCENTRATE INTRAVENOUS EVERY 5 MIN PRN
Status: DISCONTINUED | OUTPATIENT
Start: 2024-11-04 | End: 2024-11-05 | Stop reason: HOSPADM

## 2024-11-04 RX ORDER — ONDANSETRON 8 MG/1
8 TABLET, FILM COATED ORAL
Status: DISCONTINUED | OUTPATIENT
Start: 2024-11-04 | End: 2024-11-05 | Stop reason: HOSPADM

## 2024-11-04 RX ORDER — ALBUTEROL SULFATE 0.83 MG/ML
2.5 SOLUTION RESPIRATORY (INHALATION)
Status: DISCONTINUED | OUTPATIENT
Start: 2024-11-04 | End: 2024-11-05 | Stop reason: HOSPADM

## 2024-11-04 RX ORDER — LORAZEPAM 0.5 MG/1
.5-1 TABLET ORAL EVERY 6 HOURS PRN
Status: DISCONTINUED | OUTPATIENT
Start: 2024-11-04 | End: 2024-11-05 | Stop reason: HOSPADM

## 2024-11-04 RX ORDER — METHYLPREDNISOLONE SODIUM SUCCINATE 125 MG/2ML
125 INJECTION INTRAMUSCULAR; INTRAVENOUS
Status: DISCONTINUED | OUTPATIENT
Start: 2024-11-04 | End: 2024-11-05 | Stop reason: HOSPADM

## 2024-11-04 RX ORDER — DIPHENHYDRAMINE HYDROCHLORIDE 50 MG/ML
50 INJECTION INTRAMUSCULAR; INTRAVENOUS
Status: DISCONTINUED | OUTPATIENT
Start: 2024-11-04 | End: 2024-11-05 | Stop reason: HOSPADM

## 2024-11-04 RX ORDER — LORAZEPAM 2 MG/ML
.5-1 INJECTION INTRAMUSCULAR EVERY 6 HOURS PRN
Status: DISCONTINUED | OUTPATIENT
Start: 2024-11-04 | End: 2024-11-05 | Stop reason: HOSPADM

## 2024-11-04 RX ADMIN — LUTETIUM LU 177 VIPIVOTIDE TETRAXETAN 200 MILLICURIE: 27 INJECTION, SOLUTION INTRAVENOUS at 11:01

## 2024-11-04 NOTE — PROGRESS NOTES
Radiotheranostics Nursing Note:    Patient presents today for Pluvicto therapy, dose 2 of  6  Patient seen by provider today: Yes: Dr. Campos Zapata       Intravenous Access:  Peripheral IV placed by Kyra Chen RN     Treatment Conditions:  Labs done on  10/23/24    Results reviewed, labs Met treatment parameters, ok to proceed with treatment.           Post Infusion Assessment:  Patient tolerated infusion without incident.    PIV - No evidence of extravasations, access discontinued per protocol.         Discharge Plan:   Patient will return as scheduled for next appointment.   Patient discharged in stable condition accompanied by: wife  Departure Mode: Ambulatory

## 2024-12-16 ENCOUNTER — HOSPITAL ENCOUNTER (OUTPATIENT)
Dept: NUCLEAR MEDICINE | Facility: CLINIC | Age: 74
Setting detail: NUCLEAR MEDICINE
Discharge: HOME | End: 2024-12-16
Attending: INTERNAL MEDICINE | Admitting: INTERNAL MEDICINE
Payer: MEDICARE

## 2024-12-16 VITALS
HEART RATE: 80 BPM | OXYGEN SATURATION: 99 % | SYSTOLIC BLOOD PRESSURE: 120 MMHG | RESPIRATION RATE: 16 BRPM | DIASTOLIC BLOOD PRESSURE: 75 MMHG | TEMPERATURE: 98 F

## 2024-12-16 DIAGNOSIS — C61 MALIGNANT NEOPLASM OF PROSTATE (H): Primary | ICD-10-CM

## 2024-12-16 PROCEDURE — 79101 NUCLEAR RX IV ADMIN: CPT | Mod: 26 | Performed by: RADIOLOGY

## 2024-12-16 PROCEDURE — 79101 NUCLEAR RX IV ADMIN: CPT

## 2024-12-16 PROCEDURE — A9607 HC RX 344 MED OP 636: HCPCS | Mod: JZ | Performed by: INTERNAL MEDICINE

## 2024-12-16 PROCEDURE — 344N000001 HC RX 344 MED OP 636: Mod: JZ | Performed by: INTERNAL MEDICINE

## 2024-12-16 RX ORDER — EPINEPHRINE 1 MG/ML
0.3 INJECTION, SOLUTION, CONCENTRATE INTRAVENOUS EVERY 5 MIN PRN
Status: DISCONTINUED | OUTPATIENT
Start: 2024-12-16 | End: 2024-12-17 | Stop reason: HOSPADM

## 2024-12-16 RX ORDER — METHYLPREDNISOLONE SODIUM SUCCINATE 125 MG/2ML
125 INJECTION INTRAMUSCULAR; INTRAVENOUS
Status: DISCONTINUED | OUTPATIENT
Start: 2024-12-16 | End: 2024-12-17 | Stop reason: HOSPADM

## 2024-12-16 RX ORDER — ONDANSETRON 8 MG/1
8 TABLET, FILM COATED ORAL
Status: DISCONTINUED | OUTPATIENT
Start: 2024-12-16 | End: 2024-12-17 | Stop reason: HOSPADM

## 2024-12-16 RX ORDER — LORAZEPAM 2 MG/ML
.5-1 INJECTION INTRAMUSCULAR EVERY 6 HOURS PRN
Status: DISCONTINUED | OUTPATIENT
Start: 2024-12-16 | End: 2024-12-17 | Stop reason: HOSPADM

## 2024-12-16 RX ORDER — MEPERIDINE HYDROCHLORIDE 25 MG/ML
25 INJECTION INTRAMUSCULAR; INTRAVENOUS; SUBCUTANEOUS EVERY 30 MIN PRN
Status: DISCONTINUED | OUTPATIENT
Start: 2024-12-16 | End: 2024-12-17 | Stop reason: HOSPADM

## 2024-12-16 RX ORDER — DIPHENHYDRAMINE HYDROCHLORIDE 50 MG/ML
50 INJECTION INTRAMUSCULAR; INTRAVENOUS
Status: DISCONTINUED | OUTPATIENT
Start: 2024-12-16 | End: 2024-12-17 | Stop reason: HOSPADM

## 2024-12-16 RX ORDER — ALBUTEROL SULFATE 0.83 MG/ML
2.5 SOLUTION RESPIRATORY (INHALATION)
Status: DISCONTINUED | OUTPATIENT
Start: 2024-12-16 | End: 2024-12-17 | Stop reason: HOSPADM

## 2024-12-16 RX ORDER — LORAZEPAM 0.5 MG/1
.5-1 TABLET ORAL EVERY 6 HOURS PRN
Status: DISCONTINUED | OUTPATIENT
Start: 2024-12-16 | End: 2024-12-17 | Stop reason: HOSPADM

## 2024-12-16 RX ORDER — PROCHLORPERAZINE MALEATE 5 MG/1
5 TABLET ORAL EVERY 6 HOURS PRN
Status: DISCONTINUED | OUTPATIENT
Start: 2024-12-16 | End: 2024-12-17 | Stop reason: HOSPADM

## 2024-12-16 RX ORDER — ALBUTEROL SULFATE 90 UG/1
1-2 INHALANT RESPIRATORY (INHALATION)
Status: DISCONTINUED | OUTPATIENT
Start: 2024-12-16 | End: 2024-12-17 | Stop reason: HOSPADM

## 2024-12-16 RX ADMIN — LUTETIUM LU 177 VIPIVOTIDE TETRAXETAN 200 MILLICURIE: 27 INJECTION, SOLUTION INTRAVENOUS at 11:09

## 2024-12-16 NOTE — PROGRESS NOTES
Radiotheranostics Nursing Note:    Patient presents today for Pluvicto therapy, dose 3 of  6  Patient seen by provider today: Yes: Dr. Gold       Intravenous Access:  Peripheral IV placed by Kyra Chen RN     Treatment Conditions:  Labs done on  12/4/24    Results reviewed, labs Met treatment parameters, ok to proceed with treatment.           Post Infusion Assessment:  Patient tolerated infusion without incident.    PIV - No evidence of extravasations, access discontinued per protocol.         Discharge Plan:   Patient will return as scheduled for next appointment.   Patient discharged at 11:25am in stable condition accompanied by: wife  Departure Mode: Ambulatory

## 2025-01-27 ENCOUNTER — HOSPITAL ENCOUNTER (OUTPATIENT)
Dept: NUCLEAR MEDICINE | Facility: CLINIC | Age: 75
Setting detail: NUCLEAR MEDICINE
Discharge: HOME | End: 2025-01-27
Attending: INTERNAL MEDICINE
Payer: MEDICARE

## 2025-01-27 DIAGNOSIS — C61 MALIGNANT NEOPLASM OF PROSTATE (H): Primary | ICD-10-CM

## 2025-01-27 PROCEDURE — A9607 HC RX 344 MED OP 636: HCPCS | Mod: JZ | Performed by: INTERNAL MEDICINE

## 2025-01-27 PROCEDURE — 79101 NUCLEAR RX IV ADMIN: CPT

## 2025-01-27 PROCEDURE — 79101 NUCLEAR RX IV ADMIN: CPT | Mod: 26 | Performed by: RADIOLOGY

## 2025-01-27 PROCEDURE — 344N000001 HC RX 344 MED OP 636: Mod: JZ | Performed by: INTERNAL MEDICINE

## 2025-01-27 RX ORDER — MEPERIDINE HYDROCHLORIDE 25 MG/ML
25 INJECTION INTRAMUSCULAR; INTRAVENOUS; SUBCUTANEOUS EVERY 30 MIN PRN
Status: DISCONTINUED | OUTPATIENT
Start: 2025-01-27 | End: 2025-01-28 | Stop reason: HOSPADM

## 2025-01-27 RX ORDER — PROCHLORPERAZINE MALEATE 5 MG/1
5 TABLET ORAL EVERY 6 HOURS PRN
Status: DISCONTINUED | OUTPATIENT
Start: 2025-01-27 | End: 2025-01-28 | Stop reason: HOSPADM

## 2025-01-27 RX ORDER — ALBUTEROL SULFATE 0.83 MG/ML
2.5 SOLUTION RESPIRATORY (INHALATION)
Status: DISCONTINUED | OUTPATIENT
Start: 2025-01-27 | End: 2025-01-28 | Stop reason: HOSPADM

## 2025-01-27 RX ORDER — METHYLPREDNISOLONE SODIUM SUCCINATE 125 MG/2ML
125 INJECTION INTRAMUSCULAR; INTRAVENOUS
Status: DISCONTINUED | OUTPATIENT
Start: 2025-01-27 | End: 2025-01-28 | Stop reason: HOSPADM

## 2025-01-27 RX ORDER — LORAZEPAM 0.5 MG/1
.5-1 TABLET ORAL EVERY 6 HOURS PRN
Status: DISCONTINUED | OUTPATIENT
Start: 2025-01-27 | End: 2025-01-28 | Stop reason: HOSPADM

## 2025-01-27 RX ORDER — ALBUTEROL SULFATE 90 UG/1
1-2 INHALANT RESPIRATORY (INHALATION)
Status: DISCONTINUED | OUTPATIENT
Start: 2025-01-27 | End: 2025-01-28 | Stop reason: HOSPADM

## 2025-01-27 RX ORDER — EPINEPHRINE 1 MG/ML
0.3 INJECTION, SOLUTION, CONCENTRATE INTRAVENOUS EVERY 5 MIN PRN
Status: DISCONTINUED | OUTPATIENT
Start: 2025-01-27 | End: 2025-01-28 | Stop reason: HOSPADM

## 2025-01-27 RX ORDER — DIPHENHYDRAMINE HYDROCHLORIDE 50 MG/ML
50 INJECTION INTRAMUSCULAR; INTRAVENOUS
Status: DISCONTINUED | OUTPATIENT
Start: 2025-01-27 | End: 2025-01-28 | Stop reason: HOSPADM

## 2025-01-27 RX ORDER — ONDANSETRON 8 MG/1
8 TABLET, FILM COATED ORAL
Status: DISCONTINUED | OUTPATIENT
Start: 2025-01-27 | End: 2025-01-28 | Stop reason: HOSPADM

## 2025-01-27 RX ORDER — LORAZEPAM 2 MG/ML
.5-1 INJECTION INTRAMUSCULAR EVERY 6 HOURS PRN
Status: DISCONTINUED | OUTPATIENT
Start: 2025-01-27 | End: 2025-01-28 | Stop reason: HOSPADM

## 2025-01-27 RX ADMIN — LUTETIUM LU 177 VIPIVOTIDE TETRAXETAN 200 MILLICURIE: 27 INJECTION, SOLUTION INTRAVENOUS at 10:43

## 2025-01-27 NOTE — PROGRESS NOTES
Radiotheranostics Nursing Note:    Patient presents today for Pluvicto therapy, dose 4 of  6  Patient seen by provider today: Yes: Dr Zapata       Intravenous Access:  Peripheral IV placed by Lila Monet RN      Treatment Conditions:  Labs done on  1/15/25    Results reviewed, labs Met treatment parameters, ok to proceed with treatment.           Post Infusion Assessment:  Patient tolerated infusion without incident.    PIV - No evidence of extravasations, access discontinued per protocol.         Discharge Plan:   Patient will return as scheduled for next appointment.   Patient to imaging at 1100am in stable condition accompanied by: CALEB Mcgregor  Departure Mode: Ambulatory

## 2025-03-10 ENCOUNTER — HOSPITAL ENCOUNTER (OUTPATIENT)
Dept: NUCLEAR MEDICINE | Facility: CLINIC | Age: 75
Setting detail: NUCLEAR MEDICINE
Discharge: HOME | End: 2025-03-10
Attending: INTERNAL MEDICINE | Admitting: INTERNAL MEDICINE
Payer: MEDICARE

## 2025-03-10 VITALS
OXYGEN SATURATION: 100 % | HEART RATE: 80 BPM | TEMPERATURE: 98 F | RESPIRATION RATE: 16 BRPM | SYSTOLIC BLOOD PRESSURE: 125 MMHG | DIASTOLIC BLOOD PRESSURE: 70 MMHG

## 2025-03-10 DIAGNOSIS — C61 MALIGNANT NEOPLASM OF PROSTATE (H): Primary | ICD-10-CM

## 2025-03-10 PROCEDURE — 344N000001 HC RX 344 MED OP 636: Performed by: INTERNAL MEDICINE

## 2025-03-10 PROCEDURE — A9607 HC RX 344 MED OP 636: HCPCS | Performed by: INTERNAL MEDICINE

## 2025-03-10 PROCEDURE — 79101 NUCLEAR RX IV ADMIN: CPT | Mod: 26 | Performed by: RADIOLOGY

## 2025-03-10 PROCEDURE — 79101 NUCLEAR RX IV ADMIN: CPT

## 2025-03-10 RX ORDER — LORAZEPAM 0.5 MG/1
.5-1 TABLET ORAL EVERY 6 HOURS PRN
Status: DISCONTINUED | OUTPATIENT
Start: 2025-03-10 | End: 2025-03-11 | Stop reason: HOSPADM

## 2025-03-10 RX ORDER — DIPHENHYDRAMINE HYDROCHLORIDE 50 MG/ML
50 INJECTION, SOLUTION INTRAMUSCULAR; INTRAVENOUS
Status: DISCONTINUED | OUTPATIENT
Start: 2025-03-10 | End: 2025-03-11 | Stop reason: HOSPADM

## 2025-03-10 RX ORDER — ONDANSETRON 8 MG/1
8 TABLET, FILM COATED ORAL
Status: DISCONTINUED | OUTPATIENT
Start: 2025-03-10 | End: 2025-03-11 | Stop reason: HOSPADM

## 2025-03-10 RX ORDER — METHYLPREDNISOLONE SODIUM SUCCINATE 125 MG/2ML
125 INJECTION INTRAMUSCULAR; INTRAVENOUS
Status: DISCONTINUED | OUTPATIENT
Start: 2025-03-10 | End: 2025-03-11 | Stop reason: HOSPADM

## 2025-03-10 RX ORDER — PROCHLORPERAZINE MALEATE 5 MG/1
5 TABLET ORAL EVERY 6 HOURS PRN
Status: DISCONTINUED | OUTPATIENT
Start: 2025-03-10 | End: 2025-03-11 | Stop reason: HOSPADM

## 2025-03-10 RX ORDER — EPINEPHRINE 1 MG/ML
0.3 INJECTION, SOLUTION, CONCENTRATE INTRAVENOUS EVERY 5 MIN PRN
Status: DISCONTINUED | OUTPATIENT
Start: 2025-03-10 | End: 2025-03-11 | Stop reason: HOSPADM

## 2025-03-10 RX ORDER — LORAZEPAM 2 MG/ML
.5-1 INJECTION INTRAMUSCULAR EVERY 6 HOURS PRN
Status: DISCONTINUED | OUTPATIENT
Start: 2025-03-10 | End: 2025-03-11 | Stop reason: HOSPADM

## 2025-03-10 RX ORDER — ALBUTEROL SULFATE 0.83 MG/ML
2.5 SOLUTION RESPIRATORY (INHALATION)
Status: DISCONTINUED | OUTPATIENT
Start: 2025-03-10 | End: 2025-03-11 | Stop reason: HOSPADM

## 2025-03-10 RX ORDER — MEPERIDINE HYDROCHLORIDE 25 MG/ML
25 INJECTION INTRAMUSCULAR; INTRAVENOUS; SUBCUTANEOUS EVERY 30 MIN PRN
Status: DISCONTINUED | OUTPATIENT
Start: 2025-03-10 | End: 2025-03-11 | Stop reason: HOSPADM

## 2025-03-10 RX ORDER — ALBUTEROL SULFATE 90 UG/1
1-2 INHALANT RESPIRATORY (INHALATION)
Status: DISCONTINUED | OUTPATIENT
Start: 2025-03-10 | End: 2025-03-11 | Stop reason: HOSPADM

## 2025-03-10 RX ADMIN — LUTETIUM LU 177 VIPIVOTIDE TETRAXETAN 200 MILLICURIE: 27 INJECTION, SOLUTION INTRAVENOUS at 10:30

## 2025-03-10 NOTE — PROGRESS NOTES
Radiotheranostics Nursing Note:    Patient presents today for Pluvicto therapy, dose 5 of  6  Patient seen by provider today: Yes: Fellow Dr. Zapata       Intravenous Access:  Peripheral IV placed by Kyra Chen RN     Treatment Conditions:  Labs done on 2/26/25    Results reviewed, labs Met treatment parameters, ok to proceed with treatment.           Post Infusion Assessment:  Patient tolerated infusion without incident.    PIV - No evidence of extravasations, access discontinued per protocol.         Discharge Plan:   Patient will return as scheduled for next appointment.   Patient discharged a in stable condition accompanied by: wife  Departure Mode: Ambulatory

## 2025-04-21 ENCOUNTER — HOSPITAL ENCOUNTER (OUTPATIENT)
Dept: NUCLEAR MEDICINE | Facility: CLINIC | Age: 75
Setting detail: NUCLEAR MEDICINE
Discharge: HOME | End: 2025-04-21
Attending: INTERNAL MEDICINE | Admitting: INTERNAL MEDICINE
Payer: MEDICARE

## 2025-04-21 DIAGNOSIS — C61 MALIGNANT NEOPLASM OF PROSTATE (H): Primary | ICD-10-CM

## 2025-04-21 PROCEDURE — 79101 NUCLEAR RX IV ADMIN: CPT | Mod: 26 | Performed by: RADIOLOGY

## 2025-04-21 PROCEDURE — A9607 HC RX 344 MED OP 636: HCPCS | Performed by: INTERNAL MEDICINE

## 2025-04-21 PROCEDURE — 79101 NUCLEAR RX IV ADMIN: CPT

## 2025-04-21 PROCEDURE — 344N000001 HC RX 344 MED OP 636: Performed by: INTERNAL MEDICINE

## 2025-04-21 RX ORDER — DIPHENHYDRAMINE HYDROCHLORIDE 50 MG/ML
50 INJECTION, SOLUTION INTRAMUSCULAR; INTRAVENOUS
Status: DISCONTINUED | OUTPATIENT
Start: 2025-04-21 | End: 2025-04-22 | Stop reason: HOSPADM

## 2025-04-21 RX ORDER — EPINEPHRINE 1 MG/ML
0.3 INJECTION, SOLUTION, CONCENTRATE INTRAVENOUS EVERY 5 MIN PRN
Status: DISCONTINUED | OUTPATIENT
Start: 2025-04-21 | End: 2025-04-22 | Stop reason: HOSPADM

## 2025-04-21 RX ORDER — METHYLPREDNISOLONE SODIUM SUCCINATE 125 MG/2ML
125 INJECTION INTRAMUSCULAR; INTRAVENOUS
Status: DISCONTINUED | OUTPATIENT
Start: 2025-04-21 | End: 2025-04-22 | Stop reason: HOSPADM

## 2025-04-21 RX ORDER — ONDANSETRON 8 MG/1
8 TABLET, FILM COATED ORAL
Status: DISCONTINUED | OUTPATIENT
Start: 2025-04-21 | End: 2025-04-22 | Stop reason: HOSPADM

## 2025-04-21 RX ORDER — MEPERIDINE HYDROCHLORIDE 25 MG/ML
25 INJECTION INTRAMUSCULAR; INTRAVENOUS; SUBCUTANEOUS EVERY 30 MIN PRN
Status: DISCONTINUED | OUTPATIENT
Start: 2025-04-21 | End: 2025-04-22 | Stop reason: HOSPADM

## 2025-04-21 RX ORDER — LORAZEPAM 2 MG/ML
.5-1 INJECTION INTRAMUSCULAR EVERY 6 HOURS PRN
Status: DISCONTINUED | OUTPATIENT
Start: 2025-04-21 | End: 2025-04-22 | Stop reason: HOSPADM

## 2025-04-21 RX ORDER — PROCHLORPERAZINE MALEATE 5 MG/1
5 TABLET ORAL EVERY 6 HOURS PRN
Status: DISCONTINUED | OUTPATIENT
Start: 2025-04-21 | End: 2025-04-22 | Stop reason: HOSPADM

## 2025-04-21 RX ORDER — ALBUTEROL SULFATE 0.83 MG/ML
2.5 SOLUTION RESPIRATORY (INHALATION)
Status: DISCONTINUED | OUTPATIENT
Start: 2025-04-21 | End: 2025-04-22 | Stop reason: HOSPADM

## 2025-04-21 RX ORDER — ALBUTEROL SULFATE 90 UG/1
1-2 INHALANT RESPIRATORY (INHALATION)
Status: DISCONTINUED | OUTPATIENT
Start: 2025-04-21 | End: 2025-04-22 | Stop reason: HOSPADM

## 2025-04-21 RX ORDER — LORAZEPAM 0.5 MG/1
.5-1 TABLET ORAL EVERY 6 HOURS PRN
Status: DISCONTINUED | OUTPATIENT
Start: 2025-04-21 | End: 2025-04-22 | Stop reason: HOSPADM

## 2025-04-21 RX ADMIN — LUTETIUM LU 177 VIPIVOTIDE TETRAXETAN 200 MILLICURIE: 27 INJECTION, SOLUTION INTRAVENOUS at 10:52

## 2025-04-21 NOTE — PROGRESS NOTES
Radiotheranostics Nursing Note:    Patient presents today for Pluvicto therapy, dose 6 of  6  Patient seen by provider today: Yes: Dr Zapata       Intravenous Access:  Peripheral IV placed by Lila Monet RN      Treatment Conditions:  Labs done on  4/16/25    Results reviewed, labs Met treatment parameters, ok to proceed with treatment.           Post Infusion Assessment:  Patient tolerated infusion without incident.    PIV - No evidence of extravasations, access discontinued per protocol.         Discharge Plan:   Patient has completed 6 dose Pluvicto series.  Will follow up with oncologist as planned.   Patient discharged at 11:05 AM in stable condition accompanied by: spouse  Departure Mode: Ambulatory

## 2025-08-16 ENCOUNTER — HEALTH MAINTENANCE LETTER (OUTPATIENT)
Age: 75
End: 2025-08-16